# Patient Record
Sex: MALE | Race: WHITE | NOT HISPANIC OR LATINO | Employment: OTHER | URBAN - NONMETROPOLITAN AREA
[De-identification: names, ages, dates, MRNs, and addresses within clinical notes are randomized per-mention and may not be internally consistent; named-entity substitution may affect disease eponyms.]

---

## 2024-03-23 ENCOUNTER — APPOINTMENT (OUTPATIENT)
Dept: CARDIOLOGY | Facility: HOSPITAL | Age: 74
End: 2024-03-23
Payer: OTHER GOVERNMENT

## 2024-03-23 ENCOUNTER — HOSPITAL ENCOUNTER (EMERGENCY)
Facility: HOSPITAL | Age: 74
Discharge: HOME | End: 2024-03-24
Attending: FAMILY MEDICINE
Payer: OTHER GOVERNMENT

## 2024-03-23 VITALS
WEIGHT: 170 LBS | BODY MASS INDEX: 25.76 KG/M2 | RESPIRATION RATE: 17 BRPM | OXYGEN SATURATION: 96 % | HEART RATE: 56 BPM | SYSTOLIC BLOOD PRESSURE: 194 MMHG | DIASTOLIC BLOOD PRESSURE: 95 MMHG | HEIGHT: 68 IN | TEMPERATURE: 97.4 F

## 2024-03-23 DIAGNOSIS — I10 HYPERTENSION, UNSPECIFIED TYPE: Primary | ICD-10-CM

## 2024-03-23 PROCEDURE — 99284 EMERGENCY DEPT VISIT MOD MDM: CPT | Mod: 25

## 2024-03-23 PROCEDURE — 93005 ELECTROCARDIOGRAM TRACING: CPT

## 2024-03-23 PROCEDURE — 2500000005 HC RX 250 GENERAL PHARMACY W/O HCPCS: Performed by: FAMILY MEDICINE

## 2024-03-23 PROCEDURE — 96374 THER/PROPH/DIAG INJ IV PUSH: CPT

## 2024-03-23 PROCEDURE — 36415 COLL VENOUS BLD VENIPUNCTURE: CPT | Performed by: FAMILY MEDICINE

## 2024-03-23 RX ORDER — METOPROLOL TARTRATE 1 MG/ML
5 INJECTION, SOLUTION INTRAVENOUS ONCE
Status: COMPLETED | OUTPATIENT
Start: 2024-03-23 | End: 2024-03-23

## 2024-03-23 RX ORDER — METOPROLOL TARTRATE 1 MG/ML
10 INJECTION, SOLUTION INTRAVENOUS ONCE
Status: DISCONTINUED | OUTPATIENT
Start: 2024-03-23 | End: 2024-03-24 | Stop reason: HOSPADM

## 2024-03-23 RX ADMIN — METOPROLOL TARTRATE 5 MG: 1 INJECTION, SOLUTION INTRAVENOUS at 22:18

## 2024-03-23 ASSESSMENT — PAIN DESCRIPTION - DESCRIPTORS: DESCRIPTORS: HEADACHE

## 2024-03-23 ASSESSMENT — PAIN SCALES - GENERAL
PAINLEVEL_OUTOF10: 0 - NO PAIN
PAINLEVEL_OUTOF10: 2
PAINLEVEL_OUTOF10: 2
PAINLEVEL_OUTOF10: 0 - NO PAIN

## 2024-03-23 ASSESSMENT — COLUMBIA-SUICIDE SEVERITY RATING SCALE - C-SSRS
6. HAVE YOU EVER DONE ANYTHING, STARTED TO DO ANYTHING, OR PREPARED TO DO ANYTHING TO END YOUR LIFE?: NO
1. IN THE PAST MONTH, HAVE YOU WISHED YOU WERE DEAD OR WISHED YOU COULD GO TO SLEEP AND NOT WAKE UP?: NO
2. HAVE YOU ACTUALLY HAD ANY THOUGHTS OF KILLING YOURSELF?: NO

## 2024-03-23 ASSESSMENT — PAIN DESCRIPTION - FREQUENCY: FREQUENCY: INTERMITTENT

## 2024-03-23 ASSESSMENT — PAIN DESCRIPTION - ONSET: ONSET: GRADUAL

## 2024-03-23 ASSESSMENT — PAIN DESCRIPTION - PROGRESSION: CLINICAL_PROGRESSION: NOT CHANGED

## 2024-03-23 ASSESSMENT — PAIN - FUNCTIONAL ASSESSMENT: PAIN_FUNCTIONAL_ASSESSMENT: 0-10

## 2024-03-23 ASSESSMENT — PAIN DESCRIPTION - LOCATION: LOCATION: HEAD

## 2024-03-23 ASSESSMENT — PAIN DESCRIPTION - ORIENTATION: ORIENTATION: UPPER;ANTERIOR

## 2024-03-24 LAB
HOLD SPECIMEN: NORMAL

## 2024-03-24 ASSESSMENT — PAIN - FUNCTIONAL ASSESSMENT: PAIN_FUNCTIONAL_ASSESSMENT: 0-10

## 2024-03-24 ASSESSMENT — PAIN SCALES - GENERAL: PAINLEVEL_OUTOF10: 0 - NO PAIN

## 2024-03-24 NOTE — ED PROVIDER NOTES
HPI   Chief Complaint   Patient presents with    Hypertension     BP elevated at home 221/99 took extra lisinopril about 2100 hours- 40 mg recently changed from Edwar [place], from 20mg once daily.  C/o mild headache.       74-year-old male with history of hypertension comes ED with complaint of elevated blood pressure for the last several days.  Patient reports that he has seen his doctor recently and had his blood pressure medication doses changed and he says that since then he has noticed his blood pressure steadily rising.  Patient reports it continued to persist and was concerned today because it was over 200 systolic and came to the ED for evaluation.  Patient in the ED is alert, cooperative, peers anxious, comfortable, and in no distress.  Patient reports she has had no other associate symptoms or complaints at this time I was worried about the reading.  Patient currently denies headache, neck pain, chest pain, back pain, abdominal pain, shortness of breath, fevers, falls, recent trauma, sick contacts, nausea/vomiting/diarrhea, numbness/tingling, loss sensation, ataxia, dizziness, and weakness.      History provided by:  Patient and medical records   used: No                        Williams Coma Scale Score: 15                     Patient History   History reviewed. No pertinent past medical history.  History reviewed. No pertinent surgical history.  No family history on file.  Social History     Tobacco Use    Smoking status: Former     Types: Cigarettes    Smokeless tobacco: Not on file   Vaping Use    Vaping Use: Never used   Substance Use Topics    Alcohol use: Not Currently    Drug use: Never       Physical Exam   ED Triage Vitals   Temperature Heart Rate Respirations BP   03/23/24 2138 03/23/24 2138 03/23/24 2138 03/23/24 2138   36.3 °C (97.4 °F) 65 18 (!) 210/100      SpO2 Temp Source Heart Rate Source Patient Position   03/23/24 2138 03/23/24 2138 03/23/24 2209 03/23/24 2138   99 %  Tympanic Monitor Sitting      BP Location FiO2 (%)     03/23/24 2138 --     Left arm        Physical Exam  Vitals and nursing note reviewed.   Constitutional:       General: He is not in acute distress.     Appearance: He is well-developed.   HENT:      Head: Normocephalic and atraumatic.   Eyes:      Conjunctiva/sclera: Conjunctivae normal.   Cardiovascular:      Rate and Rhythm: Normal rate and regular rhythm.      Pulses: Normal pulses.      Heart sounds: Normal heart sounds, S1 normal and S2 normal. No murmur heard.  Pulmonary:      Effort: Pulmonary effort is normal. No respiratory distress.      Breath sounds: Normal breath sounds.   Abdominal:      Palpations: Abdomen is soft.      Tenderness: There is no abdominal tenderness.   Musculoskeletal:         General: No swelling.      Cervical back: Neck supple.   Skin:     General: Skin is warm and dry.      Capillary Refill: Capillary refill takes less than 2 seconds.   Neurological:      General: No focal deficit present.      Mental Status: He is alert and oriented to person, place, and time.      GCS: GCS eye subscore is 4. GCS verbal subscore is 5. GCS motor subscore is 6.      Cranial Nerves: Cranial nerves 2-12 are intact.      Sensory: Sensation is intact.      Motor: Motor function is intact.      Coordination: Coordination is intact.      Gait: Gait is intact.   Psychiatric:         Mood and Affect: Mood normal.         ED Course & MDM   Diagnoses as of 03/24/24 0001   Hypertension, unspecified type     2156 -- NSR rate 62.  Normal axis.  Normal intervals.  No ST-T changes or signs of ischemia.  Normal EKG with no findings of STEMI. Unchanged compared to old EKG    Medical Decision Making  Patient upon arrival to the ED appeared to be anxious but comfortable and in no distress with stable vital signs and noted elevated blood pressure.  Discussed with patient presenting complaints and clinical findings.  Reviewed patient's epic chart and counseled  patient on elevated blood pressure and appropriate approach to management/treatments.  After assessment and evaluation EKG was performed and reviewed, patient given IV Lopressor, placed on cardiac monitor, and observed.  After treatment and a period of rest patient was reassessed and continued to feel comfortable, continue to have no physical complaints, vitals continue to be stable, and patient blood pressure steadily improved.  Patient continued to have asymptomatic hypertension and further discussion was had with patient regarding need for blood pressure diary and monitoring at home.  At this time and his home medications were also reviewed and patient was advised to increase his doses of Norvasc/metoprolol/hydrochlorothiazide to slightly higher doses for aid with management of his elevated blood pressure.  Patient was also instructed to contact his primary care doctor on Monday to inform them of the changes as well as discuss any future plan for management of his hypertension.  Patient signs comfortable plan of care and advised to come back to the ED if he feels the blood pressure continues to be elevated despite the recommendations.  Patient stable and discharged home.    Amount and/or Complexity of Data Reviewed  External Data Reviewed: labs, radiology, ECG and notes.  ECG/medicine tests: ordered and independent interpretation performed. Decision-making details documented in ED Course.    Risk  Prescription drug management.        Procedure  Procedures     Randall Henriquez MD  03/24/24 0011

## 2024-03-25 LAB
ATRIAL RATE: 62 BPM
P AXIS: 21 DEGREES
P OFFSET: 192 MS
P ONSET: 142 MS
PR INTERVAL: 134 MS
Q ONSET: 209 MS
QRS COUNT: 10 BEATS
QRS DURATION: 92 MS
QT INTERVAL: 394 MS
QTC CALCULATION(BAZETT): 399 MS
QTC FREDERICIA: 398 MS
R AXIS: -21 DEGREES
T AXIS: -1 DEGREES
T OFFSET: 406 MS
VENTRICULAR RATE: 62 BPM

## 2024-03-26 ENCOUNTER — HOSPITAL ENCOUNTER (EMERGENCY)
Facility: HOSPITAL | Age: 74
Discharge: HOME | End: 2024-03-26
Attending: STUDENT IN AN ORGANIZED HEALTH CARE EDUCATION/TRAINING PROGRAM
Payer: OTHER GOVERNMENT

## 2024-03-26 ENCOUNTER — APPOINTMENT (OUTPATIENT)
Dept: CARDIOLOGY | Facility: HOSPITAL | Age: 74
End: 2024-03-26
Payer: OTHER GOVERNMENT

## 2024-03-26 VITALS
TEMPERATURE: 98.4 F | SYSTOLIC BLOOD PRESSURE: 186 MMHG | DIASTOLIC BLOOD PRESSURE: 95 MMHG | HEART RATE: 60 BPM | BODY MASS INDEX: 27.03 KG/M2 | OXYGEN SATURATION: 98 % | HEIGHT: 68 IN | RESPIRATION RATE: 17 BRPM | WEIGHT: 178.35 LBS

## 2024-03-26 DIAGNOSIS — F41.9 ANXIETY: ICD-10-CM

## 2024-03-26 DIAGNOSIS — I10 HYPERTENSION, UNSPECIFIED TYPE: Primary | ICD-10-CM

## 2024-03-26 LAB
ALBUMIN SERPL BCP-MCNC: 4.1 G/DL (ref 3.4–5)
ALP SERPL-CCNC: 52 U/L (ref 33–136)
ALT SERPL W P-5'-P-CCNC: 14 U/L (ref 10–52)
ANION GAP SERPL CALC-SCNC: 12 MMOL/L (ref 10–20)
AST SERPL W P-5'-P-CCNC: 14 U/L (ref 9–39)
BASOPHILS # BLD AUTO: 0.04 X10*3/UL (ref 0–0.1)
BASOPHILS NFR BLD AUTO: 1.1 %
BILIRUB SERPL-MCNC: 0.6 MG/DL (ref 0–1.2)
BUN SERPL-MCNC: 18 MG/DL (ref 6–23)
CALCIUM SERPL-MCNC: 8.8 MG/DL (ref 8.6–10.3)
CHLORIDE SERPL-SCNC: 106 MMOL/L (ref 98–107)
CO2 SERPL-SCNC: 25 MMOL/L (ref 21–32)
CREAT SERPL-MCNC: 1.28 MG/DL (ref 0.5–1.3)
EGFRCR SERPLBLD CKD-EPI 2021: 58 ML/MIN/1.73M*2
EOSINOPHIL # BLD AUTO: 0.1 X10*3/UL (ref 0–0.4)
EOSINOPHIL NFR BLD AUTO: 2.9 %
ERYTHROCYTE [DISTWIDTH] IN BLOOD BY AUTOMATED COUNT: 12.6 % (ref 11.5–14.5)
GLUCOSE SERPL-MCNC: 120 MG/DL (ref 74–99)
HCT VFR BLD AUTO: 33.8 % (ref 41–52)
HGB BLD-MCNC: 12.2 G/DL (ref 13.5–17.5)
HOLD SPECIMEN: NORMAL
IMM GRANULOCYTES # BLD AUTO: 0.01 X10*3/UL (ref 0–0.5)
IMM GRANULOCYTES NFR BLD AUTO: 0.3 % (ref 0–0.9)
LYMPHOCYTES # BLD AUTO: 1.22 X10*3/UL (ref 0.8–3)
LYMPHOCYTES NFR BLD AUTO: 34.9 %
MCH RBC QN AUTO: 32.1 PG (ref 26–34)
MCHC RBC AUTO-ENTMCNC: 36.1 G/DL (ref 32–36)
MCV RBC AUTO: 89 FL (ref 80–100)
MONOCYTES # BLD AUTO: 0.38 X10*3/UL (ref 0.05–0.8)
MONOCYTES NFR BLD AUTO: 10.9 %
NEUTROPHILS # BLD AUTO: 1.75 X10*3/UL (ref 1.6–5.5)
NEUTROPHILS NFR BLD AUTO: 49.9 %
NRBC BLD-RTO: 0 /100 WBCS (ref 0–0)
PLATELET # BLD AUTO: 183 X10*3/UL (ref 150–450)
POTASSIUM SERPL-SCNC: 3.6 MMOL/L (ref 3.5–5.3)
PROT SERPL-MCNC: 6.6 G/DL (ref 6.4–8.2)
RBC # BLD AUTO: 3.8 X10*6/UL (ref 4.5–5.9)
SODIUM SERPL-SCNC: 139 MMOL/L (ref 136–145)
WBC # BLD AUTO: 3.5 X10*3/UL (ref 4.4–11.3)

## 2024-03-26 PROCEDURE — 93005 ELECTROCARDIOGRAM TRACING: CPT

## 2024-03-26 PROCEDURE — 85025 COMPLETE CBC W/AUTO DIFF WBC: CPT | Performed by: STUDENT IN AN ORGANIZED HEALTH CARE EDUCATION/TRAINING PROGRAM

## 2024-03-26 PROCEDURE — 36415 COLL VENOUS BLD VENIPUNCTURE: CPT | Performed by: STUDENT IN AN ORGANIZED HEALTH CARE EDUCATION/TRAINING PROGRAM

## 2024-03-26 PROCEDURE — 99283 EMERGENCY DEPT VISIT LOW MDM: CPT | Mod: 25

## 2024-03-26 PROCEDURE — 80053 COMPREHEN METABOLIC PANEL: CPT | Performed by: STUDENT IN AN ORGANIZED HEALTH CARE EDUCATION/TRAINING PROGRAM

## 2024-03-26 RX ORDER — LORAZEPAM 1 MG/1
0.5 TABLET ORAL 2 TIMES DAILY PRN
Qty: 10 TABLET | Refills: 0 | Status: SHIPPED | OUTPATIENT
Start: 2024-03-26 | End: 2024-04-05

## 2024-03-26 RX ORDER — HYDROXYZINE HYDROCHLORIDE 25 MG/1
25 TABLET, FILM COATED ORAL 2 TIMES DAILY
Qty: 28 TABLET | Refills: 0 | Status: SHIPPED | OUTPATIENT
Start: 2024-03-26 | End: 2024-04-09

## 2024-03-26 ASSESSMENT — COLUMBIA-SUICIDE SEVERITY RATING SCALE - C-SSRS
1. IN THE PAST MONTH, HAVE YOU WISHED YOU WERE DEAD OR WISHED YOU COULD GO TO SLEEP AND NOT WAKE UP?: NO
2. HAVE YOU ACTUALLY HAD ANY THOUGHTS OF KILLING YOURSELF?: NO
6. HAVE YOU EVER DONE ANYTHING, STARTED TO DO ANYTHING, OR PREPARED TO DO ANYTHING TO END YOUR LIFE?: NO

## 2024-03-26 ASSESSMENT — PAIN DESCRIPTION - PROGRESSION: CLINICAL_PROGRESSION: NOT CHANGED

## 2024-03-26 ASSESSMENT — PAIN - FUNCTIONAL ASSESSMENT: PAIN_FUNCTIONAL_ASSESSMENT: 0-10

## 2024-03-26 ASSESSMENT — PAIN SCALES - GENERAL: PAINLEVEL_OUTOF10: 0 - NO PAIN

## 2024-03-26 ASSESSMENT — PAIN DESCRIPTION - DESCRIPTORS: DESCRIPTORS: PRESSURE

## 2024-03-26 NOTE — ED PROVIDER NOTES
Chief Complaint: Hypertension   HPI: This is a 74-year-old male, presenting to the emergency department for evaluation of hypertension which she noted over the last week, and worse today.  Patient states that he was seen and evaluated in the emergency department yesterday, had his medications changed, however today he still had hypertension and so returns to the emergency department.  He denies any headache, chest pain, shortness of breath, numbness, tingling, change in vision or any other symptoms at this time.  He states that he takes his blood pressure every day which is how he found his high blood pressure this morning.  Of note, the patient states that he is under significant amount of stress because his son was just arrested for fentanyl position, and is trying to blame the patient for the situation.    History reviewed. No pertinent past medical history.   History reviewed. No pertinent surgical history.    Physical Exam  Constitutional:       Appearance: Normal appearance.   HENT:      Head: Normocephalic and atraumatic.      Mouth/Throat:      Mouth: Mucous membranes are moist.   Eyes:      Extraocular Movements: Extraocular movements intact.   Cardiovascular:      Rate and Rhythm: Normal rate and regular rhythm.   Pulmonary:      Effort: Pulmonary effort is normal.   Abdominal:      General: Abdomen is flat.      Palpations: Abdomen is soft.   Skin:     General: Skin is warm.   Neurological:      General: No focal deficit present.      Mental Status: He is alert.   Psychiatric:         Mood and Affect: Mood normal.            ED Course/MDM  Diagnoses as of 03/26/24 1617   Hypertension, unspecified type     EKG interpreted by myself (ED attending physician): Normal sinus rhythm, rate of 64, normal axis, normal intervals, no ST segment changes, nonischemic EKG.    This is a 74 y.o. male presenting to the ED for evaluation of hypertension which began about a week ago and has not resolved today.  Patient  states that he was seen and evaluated in the emergency department yesterday, had his medications changed, however this morning when he took his blood pressure as he does every day, his blood pressure was still high so he presents to the emergency department.  He denies any symptoms at this time.  On physical exam, patient is resting comfortably in the bed, no acute distress.  Heart is regular rate and rhythm, lungs are clear to auscultation bilaterally.  Neuroexam is nonfocal.  Given that this is the patient's second ED visit, lab work was obtained including CBC, CMP which was all grossly unremarkable.  EKG was nonischemic.  I do not feel that any imaging is indicated at this time.  I feel that the patient's anxiety does play a role in his hypertension, and he did ask for something to help with anxiety, as such she will be given a prescription for as needed 0.5 mg Ativan as well as Atarax.  Patient was advised to continue taking his medications including the recent changes yesterday and to follow-up with his primary care provider.  Patient states that he has a appointment with his primary care provider in the morning.  He was advised to return to the emergency department for any worsening symptoms and was discharged home in stable condition.    Final Impression  1.  Anxiety  2.  Hypertension  Disposition/Plan: Discharge home  Condition at disposition: Stable.     Polly Kim DO  Emergency Medicine Physician     Polly Kim,   03/26/24 4505

## 2024-03-27 LAB
ATRIAL RATE: 64 BPM
P AXIS: 34 DEGREES
P OFFSET: 208 MS
P ONSET: 160 MS
PR INTERVAL: 132 MS
Q ONSET: 226 MS
QRS COUNT: 11 BEATS
QRS DURATION: 94 MS
QT INTERVAL: 426 MS
QTC CALCULATION(BAZETT): 439 MS
QTC FREDERICIA: 435 MS
R AXIS: -7 DEGREES
T AXIS: 11 DEGREES
T OFFSET: 439 MS
VENTRICULAR RATE: 64 BPM